# Patient Record
(demographics unavailable — no encounter records)

---

## 2024-10-08 NOTE — PHYSICAL EXAM
[de-identified] : Examination of the [bilateral] cubital tunnel reveals discomfort with compression with associated numbness and tingling at the fingertips. There is a positive tinel. Examination of the [bilateral] wrist reveals discomfort with compression at the level of the volar carpal tunnel eliciting numbness/tingling throughout the fingertips.   [de-identified] : [4] views of [bilateral hands and wrists] were reviewed today in my office and were seen by me and discussed with the patient.  These [show findings consistent with bilateral basal joint OA and findings of IP joint OA]

## 2024-10-08 NOTE — HISTORY OF PRESENT ILLNESS
[Right] : right hand dominant [FreeTextEntry1] : Garfield is a very pleasant 52-year-old male who presents today with a greater than 1 year history of worsening bilateral elbow bilateral wrist and hand discomfort numbness and tingling difficulty with ADLs.  Injections for the above have been beneficial as well.

## 2024-10-08 NOTE — ASSESSMENT
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  At this point in time he elects to proceed with injections as well.  We have discussed surgical management for these conditions.   The patient was adequately and thoroughly informed of my assessment of their current condition(s).  - This may diminish bodily function for the extremity. We discussed prognosis, tx modalities including operative and nonoperative options for the above diagnostic assessment. As always, 2nd opinion is always provided as an option.  When accessible, I was able to review other physicians note(s) including reviewing other tests, imaging results as well as personally view these results for my own interpretation.  Injection:  The risks and benefits of a steroid injection were discussed in detail. The risks include but are not limited to: pain, infection, swelling, flare response, bleeding, subcutaneous fat atrophy, skin depigmentation and/or elevation of blood sugar. The risk of incomplete resolution of symptoms, recurrence and additional intervention was reviewed and considered by the patient.  The patient agreed to proceed and under a sterile prep, I injected 1 unit into 1 cc of a combination of Celestone and Lidocaine into the bilateral carpal tunnel, The patient tolerated the injection well. The patient was adequately and thoroughly informed of my assessment of their current condition(s).  DISCUSSION: 1.  Injections as above.  Activity modification cubital tunnel.  He is considering surgery in November 2. [x]

## 2024-11-06 NOTE — PHYSICAL EXAM
[de-identified] : Examination of the [bilateral] cubital tunnel reveals discomfort with compression with associated numbness and tingling at the fingertips. There is a positive tinel. Examination of the [bilateral] wrist reveals discomfort with compression at the level of the volar carpal tunnel eliciting numbness/tingling throughout the fingertips.   [de-identified] : [4] views of [bilateral hands and wrists] were reviewed today in my office and were seen by me and discussed with the patient.  These [show findings consistent with bilateral basal joint OA and findings of IP joint OA]

## 2024-11-06 NOTE — ASSESSMENT
[FreeTextEntry1] : ASSESSMENT: The patient comes in today with chronic exacerbated symptoms of peripheral nerve impingement carpal tunnel cubital tunnel at this point in time we have discussed nonoperative modalities.  At this point in time symptoms continue to recur we have discussed the above.  The patient would like to proceed with nerve releases. The patient has significant findings consistent with carpal and cubital tunnel syndrome. We discussed nerve study findings when available and prognosis of this condition.  I told them that surgery would be of significant benefit for their acute painful symptoms, however the efficacy of surgery for sensory and motor recovery will be determined only with time.  These might not improve at all. With this in mind they elected to proceed with a carpal and cubital tunnel release.   Surgical Consent Discussion:   I explained the risks and benefits of surgical intervention to the patient. The risks include, but are not limited to: pain, infection, swelling, stiffness, injury to underlying neurovascular structures, scar sensitivity, incomplete resolution of symptoms, the possibility of the need for future surgery and finally the need to comply with a post-operative occupational therapy protocol. She understands, agrees and informed consent was obtained. The patients surgery will be scheduled in the near future.   The patient was adequately and thoroughly informed of my assessment of their current condition(s).  - This may diminish bodily function for the extremity. We discussed prognosis, tx modalities including operative and nonoperative options for the above diagnostic assessment. As always, 2nd opinion is always provided as an option.  When accessible, I was able to review other physicians note(s) including reviewing other tests, imaging results as well as personally view these results for my own interpretation.  The patient was adequately and thoroughly informed of my assessment of their current condition(s).  DISCUSSION: 1.  He elects to proceed with a right carpal and cubital tunnel releases 2. [x]

## 2024-11-06 NOTE — PHYSICAL EXAM
[de-identified] : Examination of the [bilateral] cubital tunnel reveals discomfort with compression with associated numbness and tingling at the fingertips. There is a positive tinel. Examination of the [bilateral] wrist reveals discomfort with compression at the level of the volar carpal tunnel eliciting numbness/tingling throughout the fingertips.   [de-identified] : [4] views of [bilateral hands and wrists] were reviewed today in my office and were seen by me and discussed with the patient.  These [show findings consistent with bilateral basal joint OA and findings of IP joint OA]

## 2024-11-06 NOTE — HISTORY OF PRESENT ILLNESS
[Right] : right hand dominant [FreeTextEntry1] : Garfield is a very pleasant 53-year-old male who presents today with a greater than 1 year history of worsening bilateral elbow bilateral wrist and hand discomfort numbness and tingling difficulty with ADLs.  Injections for the above have been beneficial as well.

## 2024-11-08 NOTE — HISTORY OF PRESENT ILLNESS
[Coronary Artery Disease] : coronary artery disease [Sleep Apnea] : sleep apnea [No Adverse Anesthesia Reaction] : no adverse anesthesia reaction in self or family member [(Patient denies any chest pain, claudication, dyspnea on exertion, orthopnea, palpitations or syncope)] : Patient denies any chest pain, claudication, dyspnea on exertion, orthopnea, palpitations or syncope [Moderate (4-6 METs)] : Moderate (4-6 METs) [Aortic Stenosis] : no aortic stenosis [Atrial Fibrillation] : no atrial fibrillation [Recent Myocardial Infarction] : no recent myocardial infarction [Asthma] : no asthma [COPD] : no COPD [Smoker] : not a smoker [Chronic Anticoagulation] : no chronic anticoagulation [Chronic Kidney Disease] : no chronic kidney disease [Diabetes] : no diabetes [FreeTextEntry1] : right carpal tunnel/cubital tunnel release [FreeTextEntry2] : 11/18/24 [FreeTextEntry3] : Dr. Cook [FreeTextEntry4] : 52 yo male presents for pre-op exam for right carpal tunnel/cubital tunnel release on 11/18/24 with Dr. Cook. Reports feeling well. Denies fever, chills, cp, palpitations, sob, nvcd. [FreeTextEntry7] : EKG sinus bradycardia/T wave abnormality

## 2024-11-08 NOTE — ASSESSMENT
[High Risk Surgery - Intraperitoneal, Intrathoracic or Supringuinal Vascular Procedures] : High Risk Surgery - Intraperitoneal, Intrathoracic or Supringuinal Vascular Procedures - No (0) [Ischemic Heart Disease] : Ischemic Heart Disease  - Yes (1) [Congestive Heart Failure] : Congestive Heart Failure - No (0) [Prior Cerebrovascular Accident or TIA] : Prior Cerebrovascular Accident or TIA - Yes (1) [Creatinine >= 2mg/dL (1 Point)] : Creatinine >= 2mg/dL - No (0) [Insulin-dependent Diabetic (1 Point)] : Insulin-dependent Diabetic - No (0) [2] : 2 , RCRI Class: III, Risk of Post-Op Cardiac Complications: 10.1%, 95% CI for Risk Estimate: 8.1% - 12.6% [Cardiology consultation] : Cardiology consultation [Patient Optimized for Surgery] : Patient optimized for surgery [FreeTextEntry4] : 54 yo male presents for pre-op exam for right carpal tunnel/cubital tunnel release on 11/18/24 with Dr. Cook medically optimized for procedure.

## 2024-11-08 NOTE — PLAN
[FreeTextEntry1] : Pre-op exam/carpal tunnel/cubital tunnel sydnrome: f/u Dr. Cook Hx of abnormal EKG: t wave abnormality, bradycardia, f/u cardiology Brain lesion: left occipital condyle hemangioma, currently asymp, f/u neurology/neurosurgery CAD: s/p multiple stents, last stent 2016, c/w clopidogrel as prescribed, c/w atorvastatin 80 mg po daily, f/u cardiology Liver cirrhosis: MR abdomen 12/23 with normal morphology, hepatic steatosis, no suspicious liver lesion, f/u hepatology HTN: bp reviewed, c/w current regimen, recommend low salt diet, wt loss, exercise, DASH diet Hypothyroid: c/w levothyroxine 50 mcg po daily Prediabetes: Recommend low carb diet THEA: c/w cpap, f/u pulmonology TIA: c/w clopidogrel/statin therapy, no neuro deficits, f/u neurology Monocytosis: likely reactive, recommend repeat CBC in 2 wks RTC 4 wks

## 2024-11-12 NOTE — ASSESSMENT
[FreeTextEntry1] :  Garfield Quiros is a 54 y/o male with severe steatosis w/o fibrosis, here for follow-up.  #Hepatic Steatosis - CLD w/u and viral screening negative - 12/26/23 MRE noting +Hepatomegaly 2/2 severe steatosis, w/o fibrosis or features of advanced liver disease - 9/3/24 labs demonstrating continued normalization of LFTs despite recent weight gain ALT (70 > 47) - Counseled on importance of continued diet modifications and implementation of CV exercise in efforts of optimizing metabolic profile > will discuss restarting GLP-1 with Dr. Becerra and pharmD - Reinforced judicious use of OTC meds/supps/herbals - Maintain safe ETOH consumption (no more than 2 drinks/week for men)  Repeat labs prior to f/u appt. RTC in 6 months. Plan discussed with Dr. Becerra.  Yelena Freeman, MSN, [facility] Transplant Hepatology Nurse Practitioner [facility] [address] [place] [address] T: [phone] | F: [phone].

## 2024-11-12 NOTE — ASSESSMENT
[FreeTextEntry1] :  Garfield Quiros is a 52 y/o male with severe steatosis w/o fibrosis, here for follow-up.  #Hepatic Steatosis - CLD w/u and viral screening negative - 12/26/23 MRE noting +Hepatomegaly 2/2 severe steatosis, w/o fibrosis or features of advanced liver disease - 9/3/24 labs demonstrating continued normalization of LFTs despite recent weight gain ALT (70 > 47) - Counseled on importance of continued diet modifications and implementation of CV exercise in efforts of optimizing metabolic profile > will discuss restarting GLP-1 with Dr. Becerra and pharmD - Reinforced judicious use of OTC meds/supps/herbals - Maintain safe ETOH consumption (no more than 2 drinks/week for men)  Repeat labs prior to f/u appt. RTC in 6 months. Plan discussed with Dr. Becerra.  Yelena Freeman, MSN, [facility] Transplant Hepatology Nurse Practitioner [facility] [address] [place] [address] T: [phone] | F: [phone].

## 2024-11-12 NOTE — HISTORY OF PRESENT ILLNESS
[FreeTextEntry1] : Transplant Hepatologist: Horace Becerra,  Transplant Hepatology NP: Yelena Freeman, [facility]  Garfield Quiros is a 52 y/o male with a PMH of CAD, MI, HTN, THEA, Hypothyroidism, Pre-T2DM, HLD, TIA, and abnormal liver tests 2/2 severe hepatic steatosis, here for follow-up.  Established care after referral from Dr. Ollie Hawkins for advanced fibrosis noted on fibroscan. Patient was noted to have elevated lipase (asymptomatic) on labs obtained by PCP that led to GI referral who did w/u given risk factors for liver disease. CLD w/u and viral screening negative. Reports family hx of fatty liver. No sx of decompensation. No prior liver bx. No IVDU. Professional tattoos. No blood transfusions. +Heavy ETOH use with ~4 drinks/week for many years. Prior Cocaine and Meth use. Hx of being overweight and pre-T2DM -- heaviest weight 213 lbs and was 155 lbs at age 20. Does not exercise or follow healthy diet. Born in [place]. Works as . Lives at home with family. Independent in ADLs/iADLs.  - 4/25/22  Kidney/Bladder: incident finding of fatty liver - 9/2023 fibroscan: S2/F4 - 12/26/23 MRE: +Hepatomegaly, severe steatosis, no iron deposition and no liver fibrosis (2.3 kPa)  In the interim since last visit, there have been no interim illnesses or hospitalizations. Was started on Ozempic to aid with weight loss and optimization of metabolic syndrome, and lost ~9 lbs, however, rx d/c given development of increased flatulence and belching with slightly elevated Lipase -- of note, Lipase had been mildly elevated prior to rx initiation. He has since gained the weight back, currently 210 lbs. Patient's allergies, medications, past medical, surgical, family, and social histories were reviewed and updated as appropriate. Seen in clinic today, reports that he feels well and is w/o complaints. Has plan for carpal tunnel release. Denies any recent fevers, chills, cough, lightheadedness, AMS, abdominal pain, n/v, diarrhea, hematochezia, hematemesis, and melena. Denies tobacco, or recreational drug use. Reduced ETOH use with ~2 drinks/month.

## 2024-11-12 NOTE — PHYSICAL EXAM
[Scleral Icterus] : No Scleral Icterus [Spider Angioma] : No spider angioma(s) were observed [Ascites Shifting Dullness] : no shifting dullness of ascites [Asterixis] : no asterixis observed [Jaundice] : No jaundice [General Appearance - Alert] : alert [Sclera] : the sclera and conjunctiva were normal [] : the neck was supple [Respiration, Rhythm And Depth] : normal respiratory rhythm and effort [Auscultation Breath Sounds / Voice Sounds] : lungs were clear to auscultation bilaterally [Heart Rate And Rhythm] : heart rate was normal and rhythm regular [Heart Sounds] : normal S1 and S2 [Bowel Sounds] : normal bowel sounds [Abdomen Soft] : soft [Abdomen Tenderness] : non-tender [Skin Color & Pigmentation] : normal skin color and pigmentation [Oriented To Time, Place, And Person] : oriented to person, place, and time

## 2024-11-12 NOTE — HISTORY OF PRESENT ILLNESS
[FreeTextEntry1] : Transplant Hepatologist: Horace Becerra,  Transplant Hepatology NP: Yelena Freeman, [facility]  Garfield Quiros is a 52 y/o male with a PMH of CAD, MI, HTN, THEA, Hypothyroidism, Pre-T2DM, HLD, TIA, and abnormal liver tests 2/2 severe hepatic steatosis, here for follow-up.  Established care after referral from Dr. Ollie Hawkins for advanced fibrosis noted on fibroscan. Patient was noted to have elevated lipase (asymptomatic) on labs obtained by PCP that led to GI referral who did w/u given risk factors for liver disease. CLD w/u and viral screening negative. Reports family hx of fatty liver. No sx of decompensation. No prior liver bx. No IVDU. Professional tattoos. No blood transfusions. +Heavy ETOH use with ~4 drinks/week for many years. Prior Cocaine and Meth use. Hx of being overweight and pre-T2DM -- heaviest weight 213 lbs and was 155 lbs at age 20. Does not exercise or follow healthy diet. Born [place]. Works as . Lives at home with family. Independent in ADLs/iADLs.  - 4/25/22  Kidney/Bladder: incident finding of fatty liver - 9/2023 fibroscan: S2/F4 - 12/26/23 MRE: +Hepatomegaly, severe steatosis, no iron deposition and no liver fibrosis (2.3 kPa)  In the interim since last visit, there have been no interim illnesses or hospitalizations. Was started on Ozempic to aid with weight loss and optimization of metabolic syndrome, and lost ~9 lbs, however, rx d/c given development of increased flatulence and belching with slightly elevated Lipase -- of note, Lipase had been mildly elevated prior to rx initiation. He has since gained the weight back, currently 210 lbs. Patient's allergies, medications, past medical, surgical, family, and social histories were reviewed and updated as appropriate. Seen in clinic today, reports that he feels well and is w/o complaints. Has plan for carpal tunnel release. Denies any recent fevers, chills, cough, lightheadedness, AMS, abdominal pain, n/v, diarrhea, hematochezia, hematemesis, and melena. Denies tobacco, or recreational drug use. Reduced ETOH use with ~2 drinks/month.

## 2024-11-12 NOTE — REVIEW OF SYSTEMS
[Fever] : no fever [Chills] : no chills [Feeling Poorly] : not feeling poorly [Recent Weight Gain (___ Lbs)] : recent [unfilled] ~Ulb weight gain [Chest Pain] : no chest pain [Palpitations] : no palpitations [Shortness Of Breath] : no shortness of breath [Cough] : no cough [Abdominal Pain] : no abdominal pain [Vomiting] : no vomiting [Constipation] : no constipation [Diarrhea] : no diarrhea [Melena] : no melena [Dysuria] : no dysuria [Limb Swelling] : no limb swelling [Itching] : no itching [Confused] : no confusion [Dizziness] : no dizziness

## 2024-12-03 NOTE — HISTORY OF PRESENT ILLNESS
[___ Weeks Post Op] : [unfilled] weeks post op [de-identified] : s/p right CTR and right CubTR DOS: 11/18/24 [de-identified] : Returns for follow-up. [de-identified] : Incisions are healing beautifully.  There are no signs of infection.  He tolerates right elbow range of motion well with no discomfort.  He is able to make a full composite fist with his right hand with no stiffness or discomfort. [de-identified] : We are both delighted with results.  He will commence OT to further optimize his outcome. [de-identified] : 1.  Commence OT.  Follow-up in 6 weeks.

## 2024-12-09 NOTE — HISTORY OF PRESENT ILLNESS
[Former] : Former [TextBox_13] : Mr. SHANNON is a 53 year old male with a history of CAD s/p stents, high cholesterol and HTN.  Reviewed and confirmed that the patient meets screening eligibility criteria:  53 years old   Smoking Status: Former smoker  Number of pack(s) per day: 1.5 Number of years smoked: 32 Number of pack years smokin Quit year:   No symptoms of lung cancer, including new cough, change in cough, hemoptysis, and unintentional weight loss.  No personal history of lung cancer. No lung cancer in a first degree relative. No history of lung disease.   [PacksperDay] : 1.5 [YearQuit] : 2016 [N_Years] : 32 [PacksperYear] : 48

## 2024-12-17 NOTE — HISTORY OF PRESENT ILLNESS
[FreeTextEntry1] : follow up [de-identified] : 3M f/u      as above,   feels well   Hx of chest discomfort 3 days ago requiring self-administration of Nitroglycerin SL x 2.   Pain improved   5-10 mins post dosing.  Pain occurred at rest, no recurrence since.  Instructed by Cardiology in past to go to ED if pain persists, however, it did not.    Denies dizziness, denies syncope   STABLE rare "PVCs"   no N/V   +ve unchanged loose stools no blood/black stools no urinary complaints

## 2024-12-17 NOTE — PLAN
[FreeTextEntry1] : ?? MFN induced  diarrhea       trial of Holding x 2 weeks reassess bowel habits thereafter  Trial of Zepbound 2.5mg SQ weekly for Tx of Obesity, Fatty Liver, and Prediabetes    see lab orders   f/u 1M for Wt and BP check     Adequate: hears normal conversation without difficulty

## 2025-01-14 NOTE — ASSESSMENT
[FreeTextEntry1] : ASSESSMENT: The patient comes in today with chronic exacerbated symptoms of carpal tunnel and cubital tunnel disease.  At this point in time patient has trialed bracing and even injections for these conditions in the past with improvement however at this point in time he would like to proceed with surgery.  Patient has had surgery for right sided symptoms with excellent relief he states.  We have discussed treatment options thoroughly.  The patient would like to proceed with surgery.  We have discussed postoperative rehabilitation The patient has significant findings consistent with carpal and cubital tunnel syndrome. We discussed nerve study findings when available and prognosis of this condition.  I told them that surgery would be of significant benefit for their acute painful symptoms, however the efficacy of surgery for sensory and motor recovery will be determined only with time.  These might not improve at all. With this in mind they elected to proceed with a carpal and cubital tunnel release.   Surgical Consent Discussion:   I explained the risks and benefits of surgical intervention to the patient. The risks include, but are not limited to: pain, infection, swelling, stiffness, injury to underlying neurovascular structures, scar sensitivity, incomplete resolution of symptoms, the possibility of the need for future surgery and finally the need to comply with a post-operative occupational therapy protocol. She understands, agrees and informed consent was obtained. The patients surgery will be scheduled in the near future.   The patient was adequately and thoroughly informed of my assessment of their current condition(s).  - This may diminish bodily function for the extremity. We discussed prognosis, tx modalities including operative and nonoperative options for the above diagnostic assessment. As always, 2nd opinion is always provided as an option.  When accessible, I was able to review other physicians note(s) including reviewing other tests, imaging results as well as personally view these results for my own interpretation.   The patient was adequately and thoroughly informed of my assessment of their current condition(s).  DISCUSSION: 1.  He elected proceed with left carpal and cubital tunnel releases 2.  The patient has had injections and has trialed bracing before with excellent relief 3.  Patient is also status post prior carpal and cubital tunnel releases with excellent relief he states

## 2025-01-14 NOTE — HISTORY OF PRESENT ILLNESS
[Right] : right hand dominant [FreeTextEntry1] : Garfield is a very pleasant 53-year-old male who presents today with a greater than 1 year history of worsening left elbow bilateral wrist and hand discomfort numbness and tingling difficulty with ADLs.  Patient has had injections in the past he states which have been very helpful as well as bracing.  At this point in time patient is requesting for surgery.

## 2025-01-14 NOTE — PHYSICAL EXAM
[de-identified] : Examination of the [left] cubital tunnel reveals discomfort with compression with associated numbness and tingling at the fingertips. There is a positive tinel. Exam [left] wrist + Durkan's with + N/T. There is + tinel. Patient is able to delineate numbness to median-sided digits volarly. [No obvious thenar atrophy]  [de-identified] : [4] views of [bilateral hands and wrists] were reviewed today in my office and were seen by me and discussed with the patient.  These [show findings consistent with bilateral basal joint OA and findings of IP joint OA]

## 2025-01-29 NOTE — HISTORY OF PRESENT ILLNESS
[TextBox_4] : 12/3/21 Former >30 pack year smoker. DC  M  of COPD F has pHTN CAD, HTN, TIA, MI and PAD per history THEA on CPAP by history - Dr Powers - ENT and Allergy - [place] - S10 - Nasal mask - [employer] Here principally for HEATH   22 Not SOB Sleeping well with APAP  23 Compliant with and benefiting from nocturnal CPAP  23 Compliant with and benefiting from nocturnal CPAP  25 Not seen in 16 months  I reviewed all recent notes, orders, lab results and images for 15 minutes on all available platforms (including [employer], [employer], [employer], and [employer] prior to this visit and made notes.  53M >30 pack-year smoker - DC in  HTN, CAD, TIA, MI, PAD Moderate thea Compliant with and benefiting from nocturnal CPAP

## 2025-01-29 NOTE — RESULTS/DATA
[TextEntry] : Compliance for 8/19-9/17/23=93% LDCT on 11/28/23: Stable emphysema and 2 mm nodules. LDCT on 12/13/24: Stable emphysema and 2 mm nodules. Compliance for 12/23/24-1/21/25=100%

## 2025-01-29 NOTE — DISCUSSION/SUMMARY
[FreeTextEntry1] : Assess  Obese Mild if any COPD Lung Cancer Screening candidate HEATH THEA Compliant with and benefiting from nocturnal CPAP  Plan  Weight loss Continue APAP  FU LDCT yearly in December, 2025 12-month FU

## 2025-01-29 NOTE — PHYSICAL EXAM
[No Acute Distress] : no acute distress [Elongated Uvula] : elongated uvula [Enlarged Base of the Tongue] : enlarged base of the tongue [Retrognathia] : retrognathia [II] : Mallampati Class: II [Normal Appearance] : normal appearance [Neck Circumference: ___] : neck circumference: [unfilled] [No Neck Mass] : no neck mass [Normal Rate/Rhythm] : normal rate/rhythm [Normal S1, S2] : normal s1, s2 [No Resp Distress] : no resp distress [Clear to Auscultation Bilaterally] : clear to auscultation bilaterally [No Clubbing] : no clubbing [No Cyanosis] : no cyanosis [No Edema] : no edema [TextBox_2] : obese

## 2025-02-25 NOTE — HISTORY OF PRESENT ILLNESS
[de-identified] : s/p  left carpal and cubital tunnel releases 2/10/25 [de-identified] : Returns for follow-up.  He is doing well at this time and is delighted.  His pain is well-controlled. [de-identified] : Examination of the left wrist and left elbow reveals incisions that are healing beautifully.  There are no signs of infection.  There is bruising of the left volar forearm however no evidence of hematoma, all of which can be expected with this procedure. [de-identified] : We are both delighted with results.  We have discussed occupational therapy at this stage to further optimize his outcome. [de-identified] : 1.  Commence OT.  Follow-up in 6 weeks.

## 2025-03-19 NOTE — HISTORY OF PRESENT ILLNESS
[FreeTextEntry1] : follow HTN/HLD [de-identified] : 52 yo male for f/u on HTN/HLD/Hypothyroidism    as above reports R sided axillary swelling post "pool" submergence while on vacation early 1/2025   Denies TTP,  Denies f/c/s  +ve persistent swelling    +ve pruritus   Denies CP/SOB c activity no dizziness no palpitations no syncope no N/V/D +ve BM qd NL  no bloody/black stools  no urinary complaints

## 2025-04-09 NOTE — HISTORY OF PRESENT ILLNESS
[de-identified] : s/p left carpal and cubital tunnel releases 2/10/25 s/p right carpal and cubital tunnel releases 11/18/24 [de-identified] : Garfield returns for follow-up.  He is doing well at this time and is delighted.  He reports that he is sleeping through the night without interruption which was difficult for him previously. He reports tremendous improvements with regard to numbness and tingling. [de-identified] : Examination of the bilateral wrist and bilateral elbows reveal incisions that have healed beautifully.  He tolerates bilateral elbow and wrist range of motion with no stiffness or discomfort. [de-identified] : We are both delighted with results.  We have discussed continued activity modifications as well as home exercise program and scar tissue modalities to further optimize his outcome. [de-identified] : 1.  Continue activity modifications, HEP, scar tissue modalities.   2.  He elects to follow-up as needed.  He is doing well and is delighted.  Condition is stable.

## 2025-05-16 NOTE — CONSULT LETTER
[Dear  ___] : Dear  [unfilled], [Courtesy Letter:] : I had the pleasure of seeing your patient, [unfilled], in my office today. [Sincerely,] : Sincerely, [FreeTextEntry2] : Slava Recinos M.D. [id number]  [place] [phone] [FreeTextEntry1] : Garfield Quiros is a 53-year-old male who returns today for follow-up to a known incidental lesion within the left occipital condyle.  As you recall the patient was evaluated in 2020 for syncope/dizziness.  He had underwent imaging which revealed an incidental lesion within the left occipital condyle as per last office note.  Patient has no history of cancer.  Patient denies any pain to the suboccipital region.  He denies any speaking or swallowing difficulties.  He denies any issues with cervical range of motion.  Patient denies any vision changes, balance difficulties, or nausea or vomiting.  He reports infrequent dizziness.  Patient reports headaches approximately 3-4 times a week which resolves with Tylenol.  Patient is alert and oriented.  No distress noted.  No walking difficulties noted.  Strength to bilateral upper and lower extremities 5/5.  Good cervical range of motion noted.  Negative Asaf's.  Negative clonus.  Strength to bilateral upper and lower extremities present and equal.  Cranial nerves intact.  PERRL.  EOMI.  Negative nystagmus.  Negative saccades.  Tongue protrudes in midline.  Facial sensation symmetric and normal.  Speech intact.  Swallow reflex intact.  Neck supple.  Negative pronator drift.  Negative Romberg's.  Patient is able to tandem walk without difficulties.  At this time I have provided the patient with an updated Rx for an MRI of the head with and without contrast.  Patient will follow-up in office to review these images with the neurosurgeon.  Patient is aware to call with any further questions or concerns or with any new or worsening symptoms. [FreeTextEntry3] :  MORTEZA Genao, FTOMÁSP- [facility].  [place] [address] Tel: [phone]

## 2025-05-16 NOTE — CONSULT LETTER
[Dear  ___] : Dear  [unfilled], [Courtesy Letter:] : I had the pleasure of seeing your patient, [unfilled], in my office today. [Sincerely,] : Sincerely, [FreeTextEntry2] : Slava Recinos M.D. [id number]  [place] [phone] [FreeTextEntry1] : Garfield Quiros is a 53-year-old male who returns today for follow-up to a known incidental lesion within the left occipital condyle.  As you recall the patient was evaluated in 2020 for syncope/dizziness.  He had underwent imaging which revealed an incidental lesion within the left occipital condyle as per last office note.  Patient has no history of cancer.  Patient denies any pain to the suboccipital region.  He denies any speaking or swallowing difficulties.  He denies any issues with cervical range of motion.  Patient denies any vision changes, balance difficulties, or nausea or vomiting.  He reports infrequent dizziness.  Patient reports headaches approximately 3-4 times a week which resolves with Tylenol.  Patient is alert and oriented.  No distress noted.  No walking difficulties noted.  Strength to bilateral upper and lower extremities 5/5.  Good cervical range of motion noted.  Negative Asaf's.  Negative clonus.  Strength to bilateral upper and lower extremities present and equal.  Cranial nerves intact.  PERRL.  EOMI.  Negative nystagmus.  Negative saccades.  Tongue protrudes in midline.  Facial sensation symmetric and normal.  Speech intact.  Swallow reflex intact.  Neck supple.  Negative pronator drift.  Negative Romberg's.  Patient is able to tandem walk without difficulties.  At this time I have provided the patient with an updated Rx for an MRI of the head with and without contrast.  Patient will follow-up in office to review these images with the neurosurgeon.  Patient is aware to call with any further questions or concerns or with any new or worsening symptoms. [FreeTextEntry3] :  MORTEZA Genao, FTOMÁSP- [facility].  [place] [address] Tel: [phone]

## 2025-06-14 NOTE — ASSESSMENT
[FreeTextEntry1] : Mohs surgery consultation for BCC Left Nasolabial Fold  -- I explained the treatment options of topical immunomodulatory or chemotherapy, electrodessication and curettage, radiation therapy, excision and Mohs surgery.  I recommended Mohs surgery due to the tumor type, location, and ill-defined nature of cancer.   Mohs Appropriate Use Criteria (AUC) Score: 8  I explained that following extirpation there will be a full thickness defect of the involved area. The reconstructive options will be based on the defect size and surrounding tissue laxity of the involved area. Primary closure is only possible for smaller defects. For larger defects, local tissue rearrangement or skin grafting may be necessary. Risks following layered primary closure or local tissue rearrangement include wound dehiscence, contour irregularity, bleeding, infection, and paresthesia (nerve damage including sensory deficit or motor weakness). Risks following skin grafting include wound dehiscence, skin graft nonadherence (partial or complete), contour irregularity, bleeding, infection, paresthesia, and donor site complications. I explained that the patient will need to abstain from physical activity for 1-2 weeks following the surgery, that they would heal with a scar, and also discussed the chances of infection, bleeding, potential sensory or motor nerve damage, and recurrence.  The patient indicated that s/he understood the risks and wished to schedule the procedure. -- In particular, for reconstruction we discussed linear closure  Thank you for this Mohs surgery referral. We recommended that Mr. SABAS SHANNON follow up with His referring dermatologist for routine skin exams following surgery.   Kye Melendez MD Physician, Dermatology & Dermatologic Surgery [facility]

## 2025-06-14 NOTE — PHYSICAL EXAM
[Alert] : alert [Oriented x 3] : ~L oriented x 3 [Well Nourished] : well nourished [Conjunctiva Non-injected] : conjunctiva non-injected [No Visual Lymphadenopathy] : no visual  lymphadenopathy [No Clubbing] : no clubbing [No Edema] : no edema [No Bromhidrosis] : no bromhidrosis [No Chromhidrosis] : no chromhidrosis [Hair] : Hair [Scalp] : Scalp [Face] : Face [Nose] : Nose [Eyelids] : Eyelids [Ears] : Ears [Neck] : Neck [Nodes] : Nodes [FreeTextEntry3] : -left nasolabial fold with 0.7 cm x 0.8 cm pink scar -no cervical adenioathy

## 2025-06-14 NOTE — HISTORY OF PRESENT ILLNESS
[FreeTextEntry1] : BCC Left Nasolabial Fold [de-identified] : Mohs Surgery Consultation  06/10/2025   Referred by: Dr. Terry  We had the pleasure of seeing your patient in consultation for Mohs Micrographic Surgery.  Mr. SABAS SHANNON is a 53 year old M who presents for evaluation of a recently biopsied pigmented BCC left nasolabial fold. Had been there as a brown bump x mos prior to biopsy, no treatment to date. Not painful or bleeding. No treatment to date.   Pertinent positives noted below  History of HIV or hepatitis: No Blood thinners: none Antibiotic Prophylaxis: None  Medical implants: None  Social History: no tobacco, works as a    The patient's review of systems questionnaire was reviewed. Education needs were identified. There were no barriers to learning.

## 2025-06-21 NOTE — END OF VISIT
[] : Fellow [Fellow] : Fellow [FreeTextEntry3] :  I personally saw and examined this patient with the fellow physician and was present for the key portions of history taking, examination as well as the Mohs and repair procedures performed .  I agree with the assessment and plan as documented in the fellow's note unless noted below.

## 2025-06-21 NOTE — PROCEDURE
[TextEntry] : Mohs Surgery Procedure Note   A surgical time out was taken to confirm the patient's correct identity and the correct site. The operative site was identified by the patient and surgical team prior to surgery and the patient agreed to proceed with the surgical site which was marked prior to anesthesia infiltration.   Mohs Micrographic Surgery Report Date Collected: 06/18/2025 Date Received: Same as above Date Verified: Same as above Attending Surgeon: Kye Melendez MD Fellow: Alisa Huddleston MD Assistants: Coco Benoit RN, Jerrica Suarez MA,  Maryanne Dumont MA Procedure#:  Diagnosis: BCC Location: Left nasolabial fold Pre-op Size: 0.8 cmx  0.7 cm Post-Operative Final Defect Size: 1.1 cm x 1.2 cm Stages (number of pieces per stage): 1 (2/1) Pathology, if tumor noted in stages: Debulk with atypical basaloid proliferation c/w BCC. Stage I with Sparse perivascular lymphocytic infiltrate without evidence of residual carcinoma on sections examined Indications for procedure: Ill-defined tumor margins, high-priority anatomic location for preservation of normal tissue, aggressive histologic nature of the tumor Repair type: Primary intermediate linear layered Subcutaneous and dermal sutures used: 4-0 Vicryl.      Epidermal sutures: 5-0 fast gut Total volume of anesthesia: 10 mL Repair length: 1.9 cm   Mohs Procedure Report:   The patient was escorted to the outpatient surgical operatory. The proposed Mohs procedure and reconstruction options were discussed with the patient. The risks, benefits, and alternatives were discussed and the patient signed a written consent form. A time out was taken to confirm the patient's identity and the exact location of the skin cancer. After the patient was placed in a recumbent position, the surgical site was cleaned with alcohol and either Betadine (for eyes and ears) or chlorhexidine gluconate, draped, and infiltrated with 0.5% lidocaine with 1:200,000 epinephrine local anesthetic. A sterile surgical marking pen was used to outline a thin margin of normal-appearing skin around the tumor. A beveled incision was then made using a scalpel. Small orienting nicks were made on the specimen and the surrounding skin. The tissue was then sharply dissected from the surrounding skin. Hemostasis was maintained with the electrosurgical unit and/or pressure. A temporary dressing was placed on the surgical defect until the frozen section slides were interpreted. The oriented specimen was placed in a Eva dish and transported to the Mohs laboratory. For each stage of the procedure, a visual representation of the specimen was drawn on a Mohs map. This map graphically depicts the specimen's two-dimensional appearance, orientation, and tissue preparation, which consists of dividing the specimen and applying tissue dyes. Because the deep and peripheral portions of the tissue are then embedded in the same geometric plane, the map also represents an oriented scale drawing of the resulting histologic sections. The Mohs technician then prepared frozen section slides using standard techniques. The slides were stained with hematoxylin and eosin, and cover slips were applied. The frozen section slides were then examined under the microscope. If tumor was found, it was localized on the map. The orienting nicks on the original specimen corresponded to similar nicks on the surgical defect so areas of identified tumor could be mapped back to the patient and resected. Additional layers of removed skin were then processed as indicated above. This iterative process continued as applicable until no tumor was observed microscopically. At this stage, the Mohs resection was complete.  RECONSTRUCTION PROCEDURE NOTE INTERMEDIATE LINEAR LAYERED CLOSURE   Prior to beginning the procedure, patient identity was verified, as well as the procedure to be performed and the site.  All equipment required was ready and available. The patient was positioned appropriately.   INDICATIONS:  Various reconstructive modalities were discussed with the patient, and it was decided that an intermediate linear layered closure would best preserve normal anatomical and functional relationships. After a discussion of the risks including but not limited to bleeding, scarring, infection, nerve damage, unsatisfactory results, and wound dehiscense, informed consent was obtained, and the patient underwent the procedure as follows.   PROCEDURE:  The patient was taken to the operative suite and placed supine on the operating room table. The area was anesthetized with 1% Lidocaine with 1/100,000 epinephrine. The area was washed with Chlorhexidine or Betadine, rinsed with sterile saline, and draped with sterile towels. The wound edges were debeveled, and the wound was undermined widely in all directions if needed. Hemostasis was obtained with spot electrodessication if needed. Deep dermal and subcutaneous closure was performed using the indicated buried sutures.  Using a 15 blade scalpel, redundant cones were removed as Burow's triangles to align with the relaxed skin tension lines in a curvilinear fashion if needed. The epidermis was closed and approximated using the indicated sutures. The final wound length is noted above. A sterile pressure dressing was applied, and wound care instructions were given.   FINAL PROCEDURE: Intermediate linear layered closure   COMPLICATIONS: None

## 2025-06-21 NOTE — ASSESSMENT
[FreeTextEntry1] : Mohs surgery for BCC Left nasolabial fold -- 1 stage(s) of Mohs surgery performed 06/18/2025 -- Primary intermediate repair performed -- f/u for wound check PRN -- Keflex 500 mg TID x 1 week, SED -- f/u for routine skin exams as previously recommended by His referring dermatologist.   Thank you for this Mohs surgery referral.   Reason MD Nora Physician, Dermatology & Dermatologic Surgery [facility].

## 2025-06-21 NOTE — ASSESSMENT
[FreeTextEntry1] : Mohs surgery for BCC Left nasolabial fold -- 1 stage(s) of Mohs surgery performed 06/18/2025 -- Primary intermediate repair performed -- f/u for wound check PRN -- Keflex 500 mg TID x 1 week, SED -- f/u for routine skin exams as previously recommended by His referring dermatologist.   Thank you for this Mohs surgery referral.   Reason MD Nora Physician, Dermatology & Dermatologic Surgery [facility].

## 2025-06-21 NOTE — HISTORY OF PRESENT ILLNESS
[FreeTextEntry1] : BCC Left Nasolabial Fold [de-identified] : 06/18/2025  Referred by: Dr. Terry  We had the pleasure of seeing your patient for Mohs Micrographic Surgery.  Mr. SABAS SHANNON is a 53 year old M who presents for evaluation of a recently biopsied pigmented BCC left nasolabial fold. Had been there as a brown bump x mos prior to biopsy, no treatment to date. Not painful or bleeding. No treatment to date.    Pertinent positives noted below  History of HIV or hepatitis: No Blood thinners: Plavix, fish oil Antibiotic Prophylaxis: None  Medical implants: None  Social History: no tobacco, works as a    The patient's review of systems questionnaire was reviewed. Education needs were identified. There were no barriers to learning.  Mohs surgery consultation for BCC Left Nasolabial Fold  -- I explained the treatment options of topical immunomodulatory or chemotherapy, electrodessication and curettage, radiation therapy, excision and Mohs surgery.  I recommended Mohs surgery due to the tumor type, location, and ill-defined nature of cancer.   Mohs Appropriate Use Criteria (AUC) Score: 8  I explained that following extirpation there will be a full thickness defect of the involved area. The reconstructive options will be based on the defect size and surrounding tissue laxity of the involved area. Primary closure is only possible for smaller defects. For larger defects, local tissue rearrangement or skin grafting may be necessary. Risks following layered primary closure or local tissue rearrangement include wound dehiscence, contour irregularity, bleeding, infection, and paresthesia (nerve damage including sensory deficit or motor weakness). Risks following skin grafting include wound dehiscence, skin graft nonadherence (partial or complete), contour irregularity, bleeding, infection, paresthesia, and donor site complications. I explained that the patient will need to abstain from physical activity for 1-2 weeks following the surgery, that they would heal with a scar, and also discussed the chances of infection, bleeding, potential sensory or motor nerve damage, and recurrence.  The patient indicated that s/he understood the risks and wished to proceed today -- In particular, for reconstruction we discussed linear closure  Thank you for this Mohs surgery referral. We recommended that Mr. SABAS SHANNON follow up with His referring dermatologist for routine skin exams following surgery.

## 2025-07-03 NOTE — CONSULT LETTER
[Dear  ___] : Dear  [unfilled], [Courtesy Letter:] : I had the pleasure of seeing your patient, [unfilled], in my office today. [Sincerely,] : Sincerely, [FreeTextEntry2] : ULISSES Castrejon M.D. [id number]  [place] [phone] [FreeTextEntry1] :  Subjective:   - Summary: Patient is a  presenting for routine surveillance of a left occipital condyle lesion, initially discovered in 2020 after a syncopal episode. Patient reports no changes in symptoms, no focal pain, and occasional muscle tightness.   - Chief Complaint (CC): Routine follow-up for left occipital condyle lesion   - History of Present Illness (HPI): The patient, a , was first identified to have a left occipital condyle lesion after a syncopal episode in 2020. The lesion was an incidental finding with no associated trauma or focal pain. The patient denies any changes in symptoms since the last visit. He reports occasional muscle tightness but no sharp or painful sensations. Normal head movements, including flexion, extension, and rotation, do not cause any discomfort.   - Past Medical History: Syncopal episode in 2020   - Past Surgical History:    - Family History:    - Social History: Patient works as a    - Review of Systems: Musculoskeletal: Occasional muscle tightness, no sharp pain. Neurological: No focal pain, normal range of motion in neck.   - Medications:    - Allergies:    Objective:   - Diagnostic Results: Imaging studies from 2020 to present show a stable left occipital condyle lesion. The lesion enhances on imaging but has remained unchanged in size since initial discovery. White matter changes noted on imaging, consistent with previous scans and considered non-pathological.   - Vital Signs:    - Physical Examination (PE): Normal range of motion in neck without pain or discomfort.   Assessment:   - Summary: The patient has a stable left occipital condyle lesion, likely benign, with no changes since initial discovery in 2020. Differential diagnoses include hemangioma, fibrous dysplasia, or AVM. White matter changes noted on imaging are considered non-pathological and age-appropriate.   - Problems:     - Stable left occipital condyle lesion     - Incidental white matter changes   - Differential Diagnosis:     - Hemangioma of the bone     - Fibrous dysplasia     - Arteriovenous malformation (AVM)   Plan:   - Summary: Continue current management with routine surveillance. Reassured patient about the stability of the lesion and the benign nature of the white matter changes.   - Plan:     - Continue routine surveillance with imaging every two years     - Patient educated on signs that would warrant earlier imaging (e.g., sharp pain after trauma to head or neck)     - Reassured patient about the stability of the lesion and benign nature of white matter changes     - Next follow-up appointment scheduled for two years from now     - Patient instructed to continue normal activities without restrictions     - Patient advised to contact the office if any new symptoms or concerns arise   [FreeTextEntry3] :     Hermilo Polanco MD, PhD, FRCPSC        Attending Neurosurgeon      of Neurosurgery     [facility]     [address], [address]nd [place] [address]     Office: [phone]     Fax: [phone]

## 2025-07-03 NOTE — CONSULT LETTER
[Dear  ___] : Dear  [unfilled], [Courtesy Letter:] : I had the pleasure of seeing your patient, [unfilled], in my office today. [Sincerely,] : Sincerely, [FreeTextEntry2] : ULISSES Castrejon M.D. [id number]  [place] [id number] [FreeTextEntry1] :  Subjective:   - Summary: Patient is a  presenting for routine surveillance of a left occipital condyle lesion, initially discovered in 2020 after a syncopal episode. Patient reports no changes in symptoms, no focal pain, and occasional muscle tightness.   - Chief Complaint (CC): Routine follow-up for left occipital condyle lesion   - History of Present Illness (HPI): The patient, a , was first identified to have a left occipital condyle lesion after a syncopal episode in 2020. The lesion was an incidental finding with no associated trauma or focal pain. The patient denies any changes in symptoms since the last visit. He reports occasional muscle tightness but no sharp or painful sensations. Normal head movements, including flexion, extension, and rotation, do not cause any discomfort.   - Past Medical History: Syncopal episode in 2020   - Past Surgical History:    - Family History:    - Social History: Patient works as a    - Review of Systems: Musculoskeletal: Occasional muscle tightness, no sharp pain. Neurological: No focal pain, normal range of motion in neck.   - Medications:    - Allergies:    Objective:   - Diagnostic Results: Imaging studies from 2020 to present show a stable left occipital condyle lesion. The lesion enhances on imaging but has remained unchanged in size since initial discovery. White matter changes noted on imaging, consistent with previous scans and considered non-pathological.   - Vital Signs:    - Physical Examination (PE): Normal range of motion in neck without pain or discomfort.   Assessment:   - Summary: The patient has a stable left occipital condyle lesion, likely benign, with no changes since initial discovery in 2020. Differential diagnoses include hemangioma, fibrous dysplasia, or AVM. White matter changes noted on imaging are considered non-pathological and age-appropriate.   - Problems:     - Stable left occipital condyle lesion     - Incidental white matter changes   - Differential Diagnosis:     - Hemangioma of the bone     - Fibrous dysplasia     - Arteriovenous malformation (AVM)   Plan:   - Summary: Continue current management with routine surveillance. Reassured patient about the stability of the lesion and the benign nature of the white matter changes.   - Plan:     - Continue routine surveillance with imaging every two years     - Patient educated on signs that would warrant earlier imaging (e.g., sharp pain after trauma to head or neck)     - Reassured patient about the stability of the lesion and benign nature of white matter changes     - Next follow-up appointment scheduled for two years from now     - Patient instructed to continue normal activities without restrictions     - Patient advised to contact the office if any new symptoms or concerns arise   [FreeTextEntry3] :     Hermilo Polanco MD, PhD, FRCPSC        Attending Neurosurgeon      of Neurosurgery     [facility]     [address], [address]nd [place] [address]     Office: [phone]     Fax: [phone]

## 2025-07-29 NOTE — PLAN
[FreeTextEntry1] : see lab orders    BP stable/controlled  cont all meds as Rx'ed  close monitoring   f/u 3M

## 2025-07-29 NOTE — HISTORY OF PRESENT ILLNESS
[FreeTextEntry1] :  SABAS is a 53 year-old male here for a follow up [de-identified] : 54 yo male for f/u on HTN and elevated LFTs.  pt reports tolerance and compliance c all meds as Rx'ed.   Feels well today   Denies ETOH and/or OTC Tylenol intake  w/in the last 3-4 days